# Patient Record
Sex: FEMALE | Race: BLACK OR AFRICAN AMERICAN | NOT HISPANIC OR LATINO | URBAN - METROPOLITAN AREA
[De-identification: names, ages, dates, MRNs, and addresses within clinical notes are randomized per-mention and may not be internally consistent; named-entity substitution may affect disease eponyms.]

---

## 2020-03-11 ENCOUNTER — OFFICE (OUTPATIENT)
Dept: URBAN - METROPOLITAN AREA CLINIC 19 | Facility: CLINIC | Age: 70
End: 2020-03-11

## 2020-03-11 VITALS
HEART RATE: 57 BPM | WEIGHT: 152 LBS | HEIGHT: 63 IN | SYSTOLIC BLOOD PRESSURE: 141 MMHG | DIASTOLIC BLOOD PRESSURE: 62 MMHG

## 2020-03-11 DIAGNOSIS — Z86.010 PERSONAL HISTORY OF COLONIC POLYPS: ICD-10-CM

## 2020-03-11 DIAGNOSIS — R10.13 EPIGASTRIC PAIN: ICD-10-CM

## 2020-03-11 PROCEDURE — 99203 OFFICE O/P NEW LOW 30 MIN: CPT

## 2020-03-11 NOTE — SERVICENOTES
The patient's assessment was reviewed with Dr. Gore and a collaborative plan of care was established.

## 2020-03-16 ENCOUNTER — OFFICE (OUTPATIENT)
Dept: URBAN - METROPOLITAN AREA PATHOLOGY 22 | Facility: PATHOLOGY | Age: 70
End: 2020-03-16
Payer: MEDICARE

## 2020-03-16 ENCOUNTER — AMBULATORY SURGICAL CENTER (OUTPATIENT)
Dept: URBAN - METROPOLITAN AREA SURGERY 2 | Facility: SURGERY | Age: 70
End: 2020-03-16

## 2020-03-16 ENCOUNTER — OFFICE (OUTPATIENT)
Dept: URBAN - METROPOLITAN AREA CLINIC 19 | Facility: CLINIC | Age: 70
End: 2020-03-16

## 2020-03-16 VITALS
SYSTOLIC BLOOD PRESSURE: 139 MMHG | DIASTOLIC BLOOD PRESSURE: 62 MMHG | HEIGHT: 63 IN | SYSTOLIC BLOOD PRESSURE: 146 MMHG | RESPIRATION RATE: 18 BRPM | DIASTOLIC BLOOD PRESSURE: 62 MMHG | HEART RATE: 61 BPM | HEART RATE: 51 BPM | DIASTOLIC BLOOD PRESSURE: 74 MMHG | HEART RATE: 51 BPM | SYSTOLIC BLOOD PRESSURE: 174 MMHG | WEIGHT: 153 LBS | DIASTOLIC BLOOD PRESSURE: 49 MMHG | SYSTOLIC BLOOD PRESSURE: 129 MMHG | HEART RATE: 56 BPM | DIASTOLIC BLOOD PRESSURE: 66 MMHG | RESPIRATION RATE: 16 BRPM | DIASTOLIC BLOOD PRESSURE: 74 MMHG | HEART RATE: 56 BPM | SYSTOLIC BLOOD PRESSURE: 146 MMHG | TEMPERATURE: 98.2 F | SYSTOLIC BLOOD PRESSURE: 131 MMHG | TEMPERATURE: 97.6 F | OXYGEN SATURATION: 99 % | OXYGEN SATURATION: 100 % | RESPIRATION RATE: 18 BRPM | SYSTOLIC BLOOD PRESSURE: 174 MMHG | HEART RATE: 61 BPM | HEIGHT: 63 IN | TEMPERATURE: 98.2 F | SYSTOLIC BLOOD PRESSURE: 131 MMHG | OXYGEN SATURATION: 99 % | SYSTOLIC BLOOD PRESSURE: 131 MMHG | SYSTOLIC BLOOD PRESSURE: 139 MMHG | TEMPERATURE: 97.6 F | DIASTOLIC BLOOD PRESSURE: 67 MMHG | HEART RATE: 51 BPM | DIASTOLIC BLOOD PRESSURE: 74 MMHG | HEART RATE: 56 BPM | SYSTOLIC BLOOD PRESSURE: 174 MMHG | WEIGHT: 153 LBS | WEIGHT: 153 LBS | OXYGEN SATURATION: 100 % | RESPIRATION RATE: 18 BRPM | SYSTOLIC BLOOD PRESSURE: 146 MMHG | SYSTOLIC BLOOD PRESSURE: 129 MMHG | DIASTOLIC BLOOD PRESSURE: 66 MMHG | HEIGHT: 63 IN | OXYGEN SATURATION: 99 % | SYSTOLIC BLOOD PRESSURE: 129 MMHG | DIASTOLIC BLOOD PRESSURE: 49 MMHG | DIASTOLIC BLOOD PRESSURE: 67 MMHG | DIASTOLIC BLOOD PRESSURE: 67 MMHG | OXYGEN SATURATION: 100 % | DIASTOLIC BLOOD PRESSURE: 49 MMHG | DIASTOLIC BLOOD PRESSURE: 62 MMHG | RESPIRATION RATE: 16 BRPM | SYSTOLIC BLOOD PRESSURE: 139 MMHG | HEART RATE: 60 BPM | TEMPERATURE: 97.6 F | DIASTOLIC BLOOD PRESSURE: 66 MMHG | TEMPERATURE: 98.2 F | RESPIRATION RATE: 16 BRPM | HEART RATE: 60 BPM | HEART RATE: 61 BPM | HEART RATE: 60 BPM

## 2020-03-16 DIAGNOSIS — K29.50 UNSPECIFIED CHRONIC GASTRITIS WITHOUT BLEEDING: ICD-10-CM

## 2020-03-16 DIAGNOSIS — B96.81 HELICOBACTER PYLORI [H. PYLORI] AS THE CAUSE OF DISEASES CLA: ICD-10-CM

## 2020-03-16 DIAGNOSIS — R10.13 EPIGASTRIC PAIN: ICD-10-CM

## 2020-03-16 PROBLEM — K31.89 OTHER DISEASES OF STOMACH AND DUODENUM: Status: ACTIVE | Noted: 2020-03-16

## 2020-03-16 LAB
AMYLASE: 90 U/L (ref 31–110)
CBC, PLATELET, NO DIFFERENTIAL: HEMATOCRIT: 35.8 % (ref 34–46.6)
CBC, PLATELET, NO DIFFERENTIAL: HEMOGLOBIN: 12 G/DL (ref 11.1–15.9)
CBC, PLATELET, NO DIFFERENTIAL: MCH: 29.6 PG (ref 26.6–33)
CBC, PLATELET, NO DIFFERENTIAL: MCHC: 33.5 G/DL (ref 31.5–35.7)
CBC, PLATELET, NO DIFFERENTIAL: MCV: 88 FL (ref 79–97)
CBC, PLATELET, NO DIFFERENTIAL: PLATELETS: 315 X10E3/UL (ref 150–450)
CBC, PLATELET, NO DIFFERENTIAL: RBC: 4.06 X10E6/UL (ref 3.77–5.28)
CBC, PLATELET, NO DIFFERENTIAL: RDW: 14.2 % (ref 11.7–15.4)
CBC, PLATELET, NO DIFFERENTIAL: WBC: 5.1 X10E3/UL (ref 3.4–10.8)
COMP. METABOLIC PANEL (14): A/G RATIO: 1.4 (ref 1.2–2.2)
COMP. METABOLIC PANEL (14): ALBUMIN: 4.2 G/DL (ref 3.8–4.8)
COMP. METABOLIC PANEL (14): ALKALINE PHOSPHATASE: 144 IU/L — HIGH (ref 39–117)
COMP. METABOLIC PANEL (14): ALT (SGPT): 19 IU/L (ref 0–32)
COMP. METABOLIC PANEL (14): AST (SGOT): 23 IU/L (ref 0–40)
COMP. METABOLIC PANEL (14): BILIRUBIN, TOTAL: 0.4 MG/DL (ref 0–1.2)
COMP. METABOLIC PANEL (14): BUN/CREATININE RATIO: 15 (ref 12–28)
COMP. METABOLIC PANEL (14): BUN: 13 MG/DL (ref 8–27)
COMP. METABOLIC PANEL (14): CALCIUM: 9.8 MG/DL (ref 8.7–10.3)
COMP. METABOLIC PANEL (14): CARBON DIOXIDE, TOTAL: 21 MMOL/L (ref 20–29)
COMP. METABOLIC PANEL (14): CHLORIDE: 102 MMOL/L (ref 96–106)
COMP. METABOLIC PANEL (14): CREATININE: 0.84 MG/DL (ref 0.57–1)
COMP. METABOLIC PANEL (14): EGFR IF AFRICN AM: 83 ML/MIN/1.73 (ref 59–?)
COMP. METABOLIC PANEL (14): EGFR IF NONAFRICN AM: 72 ML/MIN/1.73 (ref 59–?)
COMP. METABOLIC PANEL (14): GLOBULIN, TOTAL: 3.1 G/DL (ref 1.5–4.5)
COMP. METABOLIC PANEL (14): GLUCOSE: 81 MG/DL (ref 65–99)
COMP. METABOLIC PANEL (14): POTASSIUM: 3.8 MMOL/L (ref 3.5–5.2)
COMP. METABOLIC PANEL (14): PROTEIN, TOTAL: 7.3 G/DL (ref 6–8.5)
COMP. METABOLIC PANEL (14): SODIUM: 141 MMOL/L (ref 134–144)
LIPASE: 26 U/L (ref 14–72)
SEDIMENTATION RATE-WESTERGREN: 24 MM/HR (ref 0–40)

## 2020-03-16 PROCEDURE — 43239 EGD BIOPSY SINGLE/MULTIPLE: CPT | Performed by: INTERNAL MEDICINE

## 2020-03-16 PROCEDURE — 88312 SPECIAL STAINS GROUP 1: CPT | Performed by: INTERNAL MEDICINE

## 2020-03-16 PROCEDURE — 88313 SPECIAL STAINS GROUP 2: CPT | Performed by: INTERNAL MEDICINE

## 2020-03-16 PROCEDURE — 88342 IMHCHEM/IMCYTCHM 1ST ANTB: CPT | Performed by: INTERNAL MEDICINE

## 2020-03-16 PROCEDURE — 88305 TISSUE EXAM BY PATHOLOGIST: CPT | Performed by: INTERNAL MEDICINE

## 2020-03-16 NOTE — SERVICEHPINOTES
69-year-old  black female with chronic pain and sarcoidosis returns for EGD to evaluate chronic intermittent epigastric pain. Her sarcoidosis as well anemia of chronic disease is followed closely by hematologist, Ketan Aguirre MD at the [facility]. She is also followed by pain specialist, Cruz Mabry MD for chronic pain. He sent her here to evaluate her complaints of intermittent epigastric pain. The pain is chronic and is worse when her stomach is most empty and is somewhat relieved when she eats food. She does take Dexilant daily, which she restarted just a few weeks ago. This seems to relieve her symptoms somewhat. She denies reflux, dysphagia, change in bowel habit or overt GI bleeding.Today's lab is normal. She recently had an EGD/colonoscopy 6/18/19 with Aleksandr Reyes MD at [facility] which found as reflux esophagitis, hiatal hernia, gastritis, a hyperplastic gastric polyp, diverticulosis coli and hemorrhoids. Biopsies were negative for H pylori, but antral biopsies were suggestive of sarcoidosis. Previous colonoscopy was 5/23/16 which found diverticulosis coli and hemorrhoids. Bowel prep was suboptimal and repeat colonoscopy was recommended in 3 years. Family history is negative for colon neoplasm.

## 2020-03-16 NOTE — SERVICEHPINOTES
69-year-old  black female with chronic pain and sarcoidosis returns for EGD to evaluate chronic intermittent epigastric pain. Her sarcoidosis as well anemia of chronic disease is followed closely by hematologist, Ketan Aguirre MD at the [facility]. She is also followed by pain specialist, Cruz Mabry MD for chronic pain. He sent her here to evaluate her complaints of intermittent epigastric pain. The pain is chronic and is worse when her stomach is most empty and is somewhat relieved when she eats food. She does take Dexilant daily, which she restarted just a few weeks ago. This seems to relieve her symptoms somewhat. She denies reflux, dysphagia, change in bowel habit or overt GI bleeding.Today's lab is normal. She recently had an EGD/colonoscopy 6/18/19 with Aleksandr Reyes MD at [facility] which found as reflux esophagitis, hiatal hernia, gastritis, a hyperplastic gastric polyp, diverticulosis coli and hemorrhoids. Biopsies were negative for H pylori, but antral biopsies were suggestive of sarcoidosis. Previous colonoscopy was 5/23/16 which found diverticulosis coli and hemorrhoids. Bowel prep was suboptimal and repeat colonoscopy was recommended in 3 years. Family history is negative for colon neoplasm.

## 2020-03-16 NOTE — SERVICEHPINOTES
69-year-old  black female with chronic pain and sarcoidosis returns for EGD to evaluate chronic intermittent epigastric pain. Her sarcoidosis as well anemia of chronic disease is followed closely by hematologist, Ketan Aguirre MD at the [facility]. She is also followed by pain specialist, Cruz Mabry MD for chronic pain. He sent her here to evaluate her complaints of intermittent epigastric pain. The pain is chronic and is worse when her stomach is most empty and is somewhat relieved when she eats food. She does take Dexilant daily, which she restarted just a few weeks ago. This seems to relieve her symptoms somewhat. She denies reflux, dysphagia, change in bowel habit or overt GI bleeding.Today's lab is normal. She recently had an EGD/colonoscopy 6/18/19 with Aleksandr Reyes MD at [facility] which found as reflux esophagitis, hiatal hernia, gastritis, a hyperplastic gastric polyp, diverticulosis coli and hemorrhoids. Biopsies were negative for H pylori, but antral biopsies were suggestive of sarcoidosis. Previous colonoscopy was 5/23/16 which found diverticulosis coli and hemorrhoids. Bowel prep was suboptimal and repeat colonoscopy was recommended in 3 years. Family history is negative for colon neoplasm.

## 2024-10-25 NOTE — SERVICEHPINOTES
69-year-old  black female with chronic pain and sarcoidosis here for evaluation of chronic intermittent epigastric pain.  Her sarcoidosis as well anemia of chronic disease is followed closely by hematologist, Ketan Aguirre MD at the [facility]. She is also followed by pain specialist, Cruz Mabry MD for chronic pain.  He sent her here to evaluate her complaints of intermittent epigastric pain.  The pain is chronic and is worse when her stomach is most empty and is somewhat relieved when she eats food.  She does take Dexilant daily, which she restarted just a few weeks ago.  This seems to relieve her symptoms somewhat.  She denies reflux, dysphagia, change in bowel habit or overt GI bleeding.  She  Most recently had an EGD/colonoscopy 6/18/19 with Aleksandr Reyes MD at [facility] which found as reflux esophagitis, hiatal hernia, gastritis, a hyperplastic gastric polyp, diverticulosis coli and hemorrhoids.  Biopsies were negative for H pylori, but antral biopsies were suggestive of sarcoidosis.  Previous colonoscopy was 5/23/16 which found diverticulosis coli and hemorrhoids.  Bowel prep was suboptimal and repeat colonoscopy was recommended in 3 years.  Family history is negative for colon neoplasm.  [FreeTextEntry1] : Pt will consider OCPs. F/U in 6 months.